# Patient Record
Sex: MALE | Race: WHITE | NOT HISPANIC OR LATINO
[De-identification: names, ages, dates, MRNs, and addresses within clinical notes are randomized per-mention and may not be internally consistent; named-entity substitution may affect disease eponyms.]

---

## 2020-11-09 PROBLEM — Z00.00 ENCOUNTER FOR PREVENTIVE HEALTH EXAMINATION: Status: ACTIVE | Noted: 2020-11-09

## 2020-11-13 ENCOUNTER — APPOINTMENT (OUTPATIENT)
Dept: COLORECTAL SURGERY | Facility: CLINIC | Age: 50
End: 2020-11-13
Payer: COMMERCIAL

## 2020-11-13 VITALS — HEIGHT: 72 IN | TEMPERATURE: 97.8 F

## 2020-11-13 DIAGNOSIS — Z87.19 PERSONAL HISTORY OF OTHER DISEASES OF THE DIGESTIVE SYSTEM: ICD-10-CM

## 2020-11-13 DIAGNOSIS — R19.4 CHANGE IN BOWEL HABIT: ICD-10-CM

## 2020-11-13 DIAGNOSIS — Z87.898 PERSONAL HISTORY OF OTHER SPECIFIED CONDITIONS: ICD-10-CM

## 2020-11-13 DIAGNOSIS — Z78.9 OTHER SPECIFIED HEALTH STATUS: ICD-10-CM

## 2020-11-13 PROCEDURE — 99243 OFF/OP CNSLTJ NEW/EST LOW 30: CPT | Mod: 25

## 2020-11-13 PROCEDURE — 99072 ADDL SUPL MATRL&STAF TM PHE: CPT

## 2020-11-13 PROCEDURE — 46221 LIGATION OF HEMORRHOID(S): CPT

## 2020-11-13 RX ORDER — BUPROPION HYDROCHLORIDE 150 MG/1
150 TABLET, EXTENDED RELEASE ORAL
Refills: 0 | Status: ACTIVE | COMMUNITY

## 2020-11-13 NOTE — ASSESSMENT
[FreeTextEntry1] : 50-year-old male with prolapsing hemorrhoids. Recommend rubber band ligation procedure,Recommend high fiber diet, Metamucil daily, sitz baths, stool softeners, pain medications p.r.n.

## 2020-11-13 NOTE — HISTORY OF PRESENT ILLNESS
[FreeTextEntry1] : Consultation requested by Dr. Leroy Luke for hemorrhoids. 40-year-old male  with a long-standing history of prolapsing hemorrhoids,With long-standing history of prolapsing internal and external hemorrhoids. Symptoms have been present for many years and worsening. Bleeding as bright red with bowel movements. Has occasional pain.

## 2020-11-13 NOTE — PHYSICAL EXAM
[Abdomen Masses] : No abdominal masses [Abdomen Tenderness] : ~T No ~M abdominal tenderness [Tender] : nontender [Normal rectal exam] : exam was normal [Manually Reducible] : a manually reducible (grade III) [Tender, Swollen] : tender, swollen [Normal] : was normal [None] : there was no rectal mass  [JVD] : no jugular venous distention  [Normal Breath Sounds] : Normal breath sounds [Normal Heart Sounds] : normal heart sounds [Normal Rate and Rhythm] : normal rate and rhythm [No Rash or Lesion] : No rash or lesion [Alert] : alert [Oriented to Person] : oriented to person [Oriented to Place] : oriented to place [Oriented to Time] : oriented to time [Calm] : calm [de-identified] : Looks well in no distress, of stated age. [de-identified] : pupils equal reactive to light normocephalic atraumatic. [de-identified] : moves all 4 extremities appropriately with 5 over 5 strength

## 2020-11-13 NOTE — REVIEW OF SYSTEMS
[As Noted in HPI] : as noted in HPI [Constipation] : constipation [Diarrhea] : diarrhea [Negative] : Heme/Lymph [FreeTextEntry7] : Prolapsing hemorrhoids

## 2020-11-13 NOTE — CONSULT LETTER
[Dear  ___] : Dear  [unfilled], [Consult Letter:] : I had the pleasure of evaluating your patient, [unfilled]. [Please see my note below.] : Please see my note below. [Consult Closing:] : Thank you very much for allowing me to participate in the care of this patient.  If you have any questions, please do not hesitate to contact me. [Sincerely,] : Sincerely, [FreeTextEntry3] : Erasto Watkins M.D. FACS, FASCRS

## 2020-12-02 ENCOUNTER — APPOINTMENT (OUTPATIENT)
Dept: COLORECTAL SURGERY | Facility: CLINIC | Age: 50
End: 2020-12-02
Payer: COMMERCIAL

## 2020-12-02 VITALS — HEIGHT: 72 IN | TEMPERATURE: 97.8 F | RESPIRATION RATE: 16 BRPM

## 2020-12-02 PROCEDURE — 46221 LIGATION OF HEMORRHOID(S): CPT

## 2020-12-02 PROCEDURE — 99214 OFFICE O/P EST MOD 30 MIN: CPT | Mod: 25

## 2020-12-02 PROCEDURE — 99072 ADDL SUPL MATRL&STAF TM PHE: CPT

## 2020-12-02 NOTE — HISTORY OF PRESENT ILLNESS
[FreeTextEntry1] :  40-year-old male  with a long-standing history of prolapsing hemorrhoids,With long-standing history of prolapsing internal and external hemorrhoids. Symptoms have been present for many years and worsening. Bleeding as bright red with bowel movements. Has occasional pain.Underwent previous rubber band ligation procedure with good results

## 2020-12-02 NOTE — PHYSICAL EXAM
[Abdomen Masses] : No abdominal masses [Abdomen Tenderness] : ~T No ~M abdominal tenderness [Tender] : nontender [Normal rectal exam] : exam was normal [Manually Reducible] : a manually reducible (grade III) [Tender, Swollen] : tender, swollen [Normal] : was normal [None] : there was no rectal mass  [JVD] : no jugular venous distention  [Normal Breath Sounds] : Normal breath sounds [Normal Heart Sounds] : normal heart sounds [Normal Rate and Rhythm] : normal rate and rhythm [No Rash or Lesion] : No rash or lesion [Alert] : alert [Oriented to Person] : oriented to person [Oriented to Place] : oriented to place [Oriented to Time] : oriented to time [Calm] : calm [de-identified] : Looks well in no distress, of stated age. [de-identified] : pupils equal reactive to light normocephalic atraumatic. [de-identified] : moves all 4 extremities appropriately with 5 over 5 strength

## 2020-12-02 NOTE — CONSULT LETTER
[Please see my note below.] : Please see my note below. [Consult Closing:] : Thank you very much for allowing me to participate in the care of this patient.  If you have any questions, please do not hesitate to contact me. [Sincerely,] : Sincerely, [FreeTextEntry3] : Erasto Watkins M.D. FACS, FASCRS

## 2020-12-10 ENCOUNTER — APPOINTMENT (OUTPATIENT)
Dept: PHYSICAL MEDICINE AND REHAB | Facility: CLINIC | Age: 50
End: 2020-12-10
Payer: OTHER MISCELLANEOUS

## 2020-12-10 VITALS
SYSTOLIC BLOOD PRESSURE: 142 MMHG | TEMPERATURE: 97.7 F | DIASTOLIC BLOOD PRESSURE: 87 MMHG | HEART RATE: 74 BPM | OXYGEN SATURATION: 98 %

## 2020-12-10 PROCEDURE — 99072 ADDL SUPL MATRL&STAF TM PHE: CPT

## 2020-12-10 PROCEDURE — 99204 OFFICE O/P NEW MOD 45 MIN: CPT | Mod: GC

## 2020-12-10 RX ORDER — OMEPRAZOLE 20 MG/1
20 CAPSULE, DELAYED RELEASE ORAL
Refills: 0 | Status: ACTIVE | COMMUNITY
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
## 2020-12-10 NOTE — PHYSICAL EXAM
[FreeTextEntry1] : General:  Awake and alert in no acute distress, \par Psych: normal mood and affect. \par HEENT: NC/AT, normal visual tracking\par Pulmonary: no resp distress, chest expansion appears symmetrical\par CV: extremities are warm and perfused\par Abd: non-distended\par Ext: no c/c/e\par \par Gait: grossly normal \par \par CERVICAL SPINE REGION:\par Inspection, cervical: normal, no listing of the head, no gross asymmetries\par \par Active ROM of the cervical spine: (Estimated range):  \par Flexion: full, no pain                                 \par Right- Side-bending: moderately limited ROM; Pain on right.\par Left-Side-bending: full ROM, no pain			\par Extension: full +pain on right\par Rotation - Right: 45deg +pain    \par Rotation - Left: 90 deg -pain.			\par Oblique extension: Right full, mild pain\par Oblique extension: Left full, no pain\par -ve Lhermitte's sign\par 		\par Palpation: very mild TTP at Cervical paraspinals on right and in suboccipital area on the right\par 		\par Reflexes: Upper limbs:		RIGHT	   LEFT	\par Biceps	C5-6		                2+	    2+\par Brachioradialis	C5-6		2+                2+\par Triceps	C(6)7-8(1)		2+	    2+\par 		\par Strength, upper limbs: 	\par 5/5 in SA, EF, EE, WE, ADM, APB bilaterally \par 		\par Sensation: Upper limbs:\par Intact to pinprick over C3-T1 bilateral UE dermatomes \par 		\par Tests for cervical radiculopathy/myelopathy: 	\par Spurling’s sign: negative bilaterally\par \par Long tract signs for myelopathy/UMN process: \par UMN Sign	                           \par Guidry sign: negative bilaterally			\par Ankle clonus:			negative\par Knee jerk: 			2+/4 bilaterally\par Ankle jerk: 			2+/4 bilaterally\par \par

## 2020-12-10 NOTE — ASSESSMENT
[FreeTextEntry1] : 50M with history of diverticulits, cervical pain likely due to ligamentous strain and inflammation sustained in a work injury. \par \par 1. Start standing celebrex for inflammation for 3 weeks.  Patient instructed to take omeprazole for GI ppx while on celebrex. Side effects and drug interactions reviewed.\par 2. Continue PT 3x/week for ROM, strengthening\par 3. Prescription sent for cervical traction brace and cervical traction pillow.  These were also discussed in detail with the patient and he was made aware of their availability OTC. \par 4. Given pt's history of diverticulosis/diverticulitis, will defer medrol dose pack for the time being.  However, if patient does not improve on standing celebrex can consider short course of steroids\par 5. Pt to continue f/u with pain management for possible ablation \par 6. f/u in 3 weeks

## 2020-12-10 NOTE — REVIEW OF SYSTEMS
[Patient Intake Form Reviewed] : Patient intake form was reviewed [Joint Pain] : joint pain [Muscle Pain] : muscle pain [Negative] : Psychiatric [FreeTextEntry8] : +urinary frequency

## 2021-01-05 ENCOUNTER — APPOINTMENT (OUTPATIENT)
Dept: COLORECTAL SURGERY | Facility: CLINIC | Age: 51
End: 2021-01-05
Payer: COMMERCIAL

## 2021-01-05 PROCEDURE — 46221 LIGATION OF HEMORRHOID(S): CPT

## 2021-01-05 PROCEDURE — 99214 OFFICE O/P EST MOD 30 MIN: CPT | Mod: 25

## 2021-01-05 PROCEDURE — 99072 ADDL SUPL MATRL&STAF TM PHE: CPT

## 2021-01-06 NOTE — HISTORY OF PRESENT ILLNESS
[FreeTextEntry1] : 50-year-old male  with a long-standing history of prolapsing hemorrhoids,With long-standing history of prolapsing internal and external hemorrhoids. Symptoms have been present for many years and worsening. Bleeding as bright red with bowel movements. Has occasional pain.Underwent previous rubber band ligation procedure with good results

## 2021-01-06 NOTE — PHYSICAL EXAM
[Abdomen Masses] : No abdominal masses [Abdomen Tenderness] : ~T No ~M abdominal tenderness [Tender] : nontender [Normal rectal exam] : exam was normal [Manually Reducible] : a manually reducible (grade III) [Tender, Swollen] : tender, swollen [Normal] : was normal [None] : there was no rectal mass  [JVD] : no jugular venous distention  [Normal Breath Sounds] : Normal breath sounds [Normal Heart Sounds] : normal heart sounds [Normal Rate and Rhythm] : normal rate and rhythm [No Rash or Lesion] : No rash or lesion [Alert] : alert [Oriented to Person] : oriented to person [Oriented to Place] : oriented to place [Oriented to Time] : oriented to time [Calm] : calm [de-identified] : Looks well in no distress, of stated age. [de-identified] : pupils equal reactive to light normocephalic atraumatic. [de-identified] : moves all 4 extremities appropriately with 5 over 5 strength

## 2021-01-11 ENCOUNTER — APPOINTMENT (OUTPATIENT)
Dept: PHYSICAL MEDICINE AND REHAB | Facility: CLINIC | Age: 51
End: 2021-01-11
Payer: OTHER MISCELLANEOUS

## 2021-01-11 VITALS
SYSTOLIC BLOOD PRESSURE: 145 MMHG | OXYGEN SATURATION: 98 % | HEIGHT: 74 IN | WEIGHT: 180 LBS | HEART RATE: 81 BPM | DIASTOLIC BLOOD PRESSURE: 94 MMHG | BODY MASS INDEX: 23.1 KG/M2 | TEMPERATURE: 97.8 F

## 2021-01-11 PROCEDURE — 99214 OFFICE O/P EST MOD 30 MIN: CPT

## 2021-01-11 PROCEDURE — 99072 ADDL SUPL MATRL&STAF TM PHE: CPT

## 2021-01-11 RX ORDER — BUPROPION HYDROCHLORIDE 150 MG/1
150 TABLET, EXTENDED RELEASE ORAL
Qty: 30 | Refills: 0 | Status: ACTIVE | COMMUNITY
Start: 2020-10-09

## 2021-01-11 RX ORDER — DEXTROAMPHETAMINE SACCHARATE, AMPHETAMINE ASPARTATE, DEXTROAMPHETAMINE SULFATE AND AMPHETAMINE SULFATE 2.5; 2.5; 2.5; 2.5 MG/1; MG/1; MG/1; MG/1
10 TABLET ORAL
Qty: 30 | Refills: 0 | Status: ACTIVE | COMMUNITY
Start: 2020-12-22

## 2021-01-11 RX ORDER — OXYBUTYNIN CHLORIDE 5 MG/1
5 TABLET ORAL
Refills: 0 | Status: DISCONTINUED | COMMUNITY
End: 2021-01-11
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
## 2021-01-11 NOTE — ASSESSMENT
[FreeTextEntry1] : Patient is a 50-year-old male history of diverticulitis and neck pain likely 2/2 cervical spondylosis/strain, no radicular dysfunction on clinical exam. Patient has been responding well to course of Celebrex. Prescription provided for the Medrol Dosepak with omeprazole and patient to restart Celebrex on the final day of the Medrol Dosepak. Side effects and drug interactions reviewed. Recommend patient used the cervical traction brace one-hour per evening for the next 2 weeks to assess efficacy. Patient to continue outpatient physical therapy and is scheduled for electrodiagnostic testing next week. Patient to have a results of electrodiagnostic testing and previous MRI fax to the office.\par \par I spent a total of 25 minutes on the date of encounter evaluating and treating the patient including discussion of treatment options.

## 2021-01-11 NOTE — REVIEW OF SYSTEMS
[Negative] : Gastrointestinal [Fever] : no fever [Incontinence] : no incontinence [Muscle Weakness] : no muscle weakness [Skin Wound] : no skin wound [Difficulty Walking] : no difficulty walking

## 2021-01-11 NOTE — PHYSICAL EXAM
[FreeTextEntry1] : General:  Awake and alert in no acute distress, \par Psych: normal mood and affect. \par HEENT: NC/AT\par Ext: no c/c/e\par \par Gait: grossly normal \par \par CERVICAL SPINE REGION:\par Inspection, cervical: normal, no listing of the head, no gross asymmetries\par \par Active ROM of the cervical spine: (Estimated range):  \par Flexion: full, no pain                                 			\par Extension: full +pain on right, (-)Spurling test			\par Oblique extension: Right full, mild pain\par Oblique extension: Left full, no pain\par -ve Lhermitte's sign\par 		\par Palpation: (+) TTP at suboccipital area on the right, no paraspinal spasm\par 		\par Reflexes: Upper limbs:		RIGHT	   LEFT	\par Biceps	C5-6		                2+	    2+\par Brachioradialis	C5-6		2+                2+\par Triceps	C(6)7-8(1)		2+	    2+\par \par Knee jerk: 			2+/4 bilaterally\par Ankle jerk: 			2+/4 bilaterally\par 		\par Strength, upper limbs: 	\par 5/5 in SA, EF, EE, WE, bilaterally \par 		\par Sensation: Upper limbs:\par Intact to pinprick over C3-T1 bilateral UE dermatomes \par 		\par \par \par

## 2021-02-02 ENCOUNTER — APPOINTMENT (OUTPATIENT)
Dept: COLORECTAL SURGERY | Facility: CLINIC | Age: 51
End: 2021-02-02

## 2021-02-22 ENCOUNTER — APPOINTMENT (OUTPATIENT)
Dept: PHYSICAL MEDICINE AND REHAB | Facility: CLINIC | Age: 51
End: 2021-02-22
Payer: OTHER MISCELLANEOUS

## 2021-02-22 VITALS
DIASTOLIC BLOOD PRESSURE: 90 MMHG | TEMPERATURE: 97.7 F | OXYGEN SATURATION: 99 % | SYSTOLIC BLOOD PRESSURE: 139 MMHG | HEART RATE: 64 BPM

## 2021-02-22 PROCEDURE — 99072 ADDL SUPL MATRL&STAF TM PHE: CPT

## 2021-02-22 PROCEDURE — 99213 OFFICE O/P EST LOW 20 MIN: CPT

## 2021-02-22 RX ORDER — BUDESONIDE AND FORMOTEROL FUMARATE DIHYDRATE 160; 4.5 UG/1; UG/1
160-4.5 AEROSOL RESPIRATORY (INHALATION)
Qty: 10 | Refills: 0 | Status: ACTIVE | COMMUNITY
Start: 2021-02-12

## 2021-02-22 RX ORDER — BUDESONIDE 180 UG/1
180 AEROSOL, POWDER RESPIRATORY (INHALATION)
Qty: 1 | Refills: 0 | Status: ACTIVE | COMMUNITY
Start: 2021-01-06

## 2021-02-22 RX ORDER — FAMOTIDINE 40 MG/1
40 TABLET, FILM COATED ORAL
Qty: 30 | Refills: 0 | Status: ACTIVE | COMMUNITY
Start: 2021-02-10

## 2021-02-22 RX ORDER — METHYLPREDNISOLONE 4 MG/1
4 TABLET ORAL
Qty: 1 | Refills: 0 | Status: DISCONTINUED | COMMUNITY
Start: 2021-01-11 | End: 2021-02-22

## 2021-02-22 NOTE — PHYSICAL EXAM
[FreeTextEntry1] : General:  Awake and alert in no acute distress, \par Psych: normal mood and affect. \par HEENT: NC/AT\par Ext: no c/c/e\par \par Gait: grossly normal, intact heel and toe walk\par \par CERVICAL SPINE REGION:\par Inspection, cervical: normal, no listing of the head, no gross asymmetries. No paraspinal muscle spasm. (+)tender points on right cervical paraspinal muscles\par \par Active ROM of the cervical spine: (Estimated range):  \par Flexion: full, no pain                                 			\par Extension: full +pain on right, (-)Spurling test			\par Oblique extension: Right full, mild pain\par Oblique extension: Left full, no pain\par \par 		\par Palpation: (+) TTP at suboccipital area on the right, no paraspinal spasm\par 		\par Reflexes: Upper limbs:		RIGHT	   LEFT	\par Biceps	C5-6		                1+	    1+\par Brachioradialis	C5-6		1+                1+\par Triceps	C(6)7-8(1)		1/2+	    1/2+\par \par Knee jerk: 			1/2 bilaterally\par Ankle jerk: 			2/4 bilaterally\par Negative inverted radial reflex (B)\par 		\par Strength, upper limbs: 	\par 5/5 in SA, EF, EE, WE, bilaterally \par 		\par Sensation: Upper limbs:\par Intact to pinprick/LT over C3-T1 bilateral UE dermatomes \par 		\par \par \par
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
## 2021-02-22 NOTE — ASSESSMENT
[FreeTextEntry1] : Patient is a 50-year-old male history of diverticulitis and neck pain likely 2/2 cervical spondylosis/strain, no radicular dysfunction on clinical exam. Patient has been responding well to course of Celebrex. Prescription provided for the Medrol Dosepak with omeprazole and patient to restart Celebrex on the final day of the Medrol Dosepak. Side effects and drug interactions reviewed. Reviewed with patient cervical stretching program including isometric reciprocal inhibition which should be performed at least b.i.d. Patient to continue outpatient physical therapy and results of electrodiagnostic testing to be faxed to office. Reviewed results of MRI cervical spine (8/2020) with patient. Patient with pain management f/u tomorrow.\par \par I spent a total of 25 minutes on the date of encounter evaluating and treating the patient including discussion of treatment options.

## 2021-03-02 ENCOUNTER — APPOINTMENT (OUTPATIENT)
Dept: COLORECTAL SURGERY | Facility: CLINIC | Age: 51
End: 2021-03-02
Payer: COMMERCIAL

## 2021-03-02 VITALS — TEMPERATURE: 97.3 F | WEIGHT: 180 LBS | RESPIRATION RATE: 16 BRPM | HEIGHT: 74 IN | BODY MASS INDEX: 23.1 KG/M2

## 2021-03-02 DIAGNOSIS — K64.4 RESIDUAL HEMORRHOIDAL SKIN TAGS: ICD-10-CM

## 2021-03-02 DIAGNOSIS — K64.8 RESIDUAL HEMORRHOIDAL SKIN TAGS: ICD-10-CM

## 2021-03-02 PROCEDURE — 99072 ADDL SUPL MATRL&STAF TM PHE: CPT

## 2021-03-02 PROCEDURE — 99214 OFFICE O/P EST MOD 30 MIN: CPT | Mod: 25

## 2021-03-02 PROCEDURE — 46221 LIGATION OF HEMORRHOID(S): CPT

## 2021-03-02 NOTE — PHYSICAL EXAM
[Abdomen Masses] : No abdominal masses [Abdomen Tenderness] : ~T No ~M abdominal tenderness [Tender] : nontender [Normal rectal exam] : exam was normal [Manually Reducible] : a manually reducible (grade III) [Tender, Swollen] : tender, swollen [Normal] : was normal [None] : there was no rectal mass  [JVD] : no jugular venous distention  [Normal Breath Sounds] : Normal breath sounds [Normal Heart Sounds] : normal heart sounds [Normal Rate and Rhythm] : normal rate and rhythm [No Rash or Lesion] : No rash or lesion [Alert] : alert [Oriented to Person] : oriented to person [Oriented to Place] : oriented to place [Oriented to Time] : oriented to time [Calm] : calm [de-identified] : Looks well in no distress, of stated age. [de-identified] : pupils equal reactive to light normocephalic atraumatic. [de-identified] : moves all 4 extremities appropriately with 5 over 5 strength

## 2021-04-05 ENCOUNTER — APPOINTMENT (OUTPATIENT)
Dept: PHYSICAL MEDICINE AND REHAB | Facility: CLINIC | Age: 51
End: 2021-04-05

## 2021-09-21 ENCOUNTER — APPOINTMENT (OUTPATIENT)
Dept: PHYSICAL MEDICINE AND REHAB | Facility: CLINIC | Age: 51
End: 2021-09-21
Payer: OTHER MISCELLANEOUS

## 2021-09-21 VITALS
TEMPERATURE: 96.7 F | SYSTOLIC BLOOD PRESSURE: 138 MMHG | OXYGEN SATURATION: 98 % | DIASTOLIC BLOOD PRESSURE: 90 MMHG | HEART RATE: 81 BPM

## 2021-09-21 DIAGNOSIS — M50.20 OTHER CERVICAL DISC DISPLACEMENT, UNSPECIFIED CERVICAL REGION: ICD-10-CM

## 2021-09-21 DIAGNOSIS — M54.2 CERVICALGIA: ICD-10-CM

## 2021-09-21 PROCEDURE — 99213 OFFICE O/P EST LOW 20 MIN: CPT

## 2021-09-21 PROCEDURE — 99072 ADDL SUPL MATRL&STAF TM PHE: CPT

## 2021-09-21 RX ORDER — METHYLPREDNISOLONE 4 MG/1
4 TABLET ORAL
Qty: 1 | Refills: 0 | Status: COMPLETED | OUTPATIENT
Start: 2021-02-22 | End: 2021-09-21

## 2021-09-21 RX ORDER — CELECOXIB 200 MG/1
200 CAPSULE ORAL
Qty: 60 | Refills: 0 | Status: COMPLETED | COMMUNITY
Start: 2020-12-10 | End: 2021-09-21

## 2021-09-21 NOTE — ASSESSMENT
[FreeTextEntry1] : Patient is a 51-year-old male history of diverticulitis, chronic neck pain within no radicular dysfunction on clinical exam. Reviewed MRI cervical spine from September, 2021 with patient. Agree with continued conservative management and discussed with patient recommendation for being involved in a comprehensive pain management service. Will initiate Cymbalta 20 mg p.o. q. daily to help address his chronic pain, side effects and drug interaction reviewed. Discuss with patient at this is a low dose and should be titrated upwards with his pain management service. 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 \par \par I spent a total of 20 minutes on the date of the encounter evaluating and treatment patient including a review of MRI results and discussion of treatment options.

## 2021-09-21 NOTE — PHYSICAL EXAM
[FreeTextEntry1] : General:  Awake and alert in no acute distress, cooperative \par HEENT: NC/AT, MMM\par Ext: no c/c/e\par \par Gait: grossly normal. Heel and Toe walk intact.\par \par CERVICAL SPINE REGION:\par Inspection, cervical: normal, no listing of the head, no gross asymmetries. No paraspinal muscle spasm\par \par Active ROM of the cervical spine: (Estimated range):  \par Flexion: limited by (-)3 fb with pain reported                                \par Right- Side-bending: moderately limited ROM; Pain on right, ~20deg\par Left-Side-bending: ~20deg, no pain			\par Extension: +pain on right, to ~20deg\par Rotation - Right: 45deg +pain    \par Rotation - Left: 45 deg -pain.			\par (-)Spurling sign\par 		\par Palpation:  mild Tender point at proximal right trapezius muscle. \par 		\par MSR: biceps, BR, TR +2, symmetric. Negative inverted radial reflex (B). +1 KJ, +1 AJ and symmetric.\par 	\par Strength, upper limbs: 	\par 5/5 in SA, EF, EE, WE, ADM, APB bilaterally \par 		\par Sensation: Upper limbs:\par Intact to pinprick over C5-T1 bilateral UE dermatomes \par 		\par \par \par \par
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
## 2021-09-21 NOTE — REVIEW OF SYSTEMS
[Negative] : Respiratory [Fever] : no fever [Incontinence] : no incontinence [Muscle Weakness] : no muscle weakness [Difficulty Walking] : no difficulty walking

## 2021-10-05 ENCOUNTER — TRANSCRIPTION ENCOUNTER (OUTPATIENT)
Age: 51
End: 2021-10-05

## 2022-01-27 ENCOUNTER — RX RENEWAL (OUTPATIENT)
Age: 52
End: 2022-01-27

## 2022-02-24 ENCOUNTER — APPOINTMENT (OUTPATIENT)
Dept: PHYSICAL MEDICINE AND REHAB | Facility: CLINIC | Age: 52
End: 2022-02-24
Payer: OTHER MISCELLANEOUS

## 2022-02-24 DIAGNOSIS — M47.812 SPONDYLOSIS W/OUT MYELOPATHY OR RADICULOPATHY, CERVICAL REGION: ICD-10-CM

## 2022-02-24 DIAGNOSIS — M54.2 CERVICALGIA: ICD-10-CM

## 2022-02-24 DIAGNOSIS — G89.29 CERVICALGIA: ICD-10-CM

## 2022-02-24 PROCEDURE — 99212 OFFICE O/P EST SF 10 MIN: CPT | Mod: 95

## 2022-02-24 RX ORDER — DULOXETINE HYDROCHLORIDE 20 MG/1
20 CAPSULE, DELAYED RELEASE PELLETS ORAL
Qty: 30 | Refills: 3 | Status: DISCONTINUED | COMMUNITY
Start: 2021-09-21 | End: 2022-02-24
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
## 2022-02-24 NOTE — REVIEW OF SYSTEMS
[Negative] : Gastrointestinal [Fever] : no fever [Incontinence] : no incontinence [Muscle Weakness] : no muscle weakness [Difficulty Walking] : no difficulty walking
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
## 2022-02-24 NOTE — REASON FOR VISIT
[Home] : at home, [unfilled] , at the time of the visit. [Medical Office: ([facility])___] : at the medical office located in  [Verbal consent obtained from patient] : the patient, [unfilled] [Follow-Up] : a follow-up visit

## 2022-03-20 ENCOUNTER — FORM ENCOUNTER (OUTPATIENT)
Age: 52
End: 2022-03-20

## 2022-03-22 ENCOUNTER — NON-APPOINTMENT (OUTPATIENT)
Age: 52
End: 2022-03-22

## 2022-03-24 ENCOUNTER — OUTPATIENT (OUTPATIENT)
Dept: OUTPATIENT SERVICES | Facility: HOSPITAL | Age: 52
LOS: 1 days | End: 2022-03-24
Payer: COMMERCIAL

## 2022-03-24 ENCOUNTER — APPOINTMENT (OUTPATIENT)
Age: 52
End: 2022-03-24
Payer: COMMERCIAL

## 2022-03-24 DIAGNOSIS — G47.33 OBSTRUCTIVE SLEEP APNEA (ADULT) (PEDIATRIC): ICD-10-CM

## 2022-03-24 PROCEDURE — 95811 POLYSOM 6/>YRS CPAP 4/> PARM: CPT

## 2022-03-24 PROCEDURE — 95811 POLYSOM 6/>YRS CPAP 4/> PARM: CPT | Mod: 26

## 2022-06-20 ENCOUNTER — RX RENEWAL (OUTPATIENT)
Age: 52
End: 2022-06-20

## 2022-08-18 ENCOUNTER — RX RENEWAL (OUTPATIENT)
Age: 52
End: 2022-08-18

## 2022-08-18 RX ORDER — DULOXETINE HYDROCHLORIDE 30 MG/1
30 CAPSULE, DELAYED RELEASE PELLETS ORAL
Qty: 30 | Refills: 4 | Status: ACTIVE | COMMUNITY
Start: 2022-02-24 | End: 1900-01-01

## 2022-11-06 ENCOUNTER — TRANSCRIPTION ENCOUNTER (OUTPATIENT)
Age: 52
End: 2022-11-06

## 2022-11-07 ENCOUNTER — OUTPATIENT (OUTPATIENT)
Dept: OUTPATIENT SERVICES | Facility: HOSPITAL | Age: 52
LOS: 1 days | End: 2022-11-07
Payer: COMMERCIAL

## 2022-11-07 DIAGNOSIS — M54.12 RADICULOPATHY, CERVICAL REGION: ICD-10-CM

## 2022-11-07 PROCEDURE — 76000 FLUOROSCOPY <1 HR PHYS/QHP: CPT

## 2022-11-07 PROCEDURE — 62321 NJX INTERLAMINAR CRV/THRC: CPT

## 2023-07-09 ENCOUNTER — TRANSCRIPTION ENCOUNTER (OUTPATIENT)
Age: 53
End: 2023-07-09

## 2023-07-10 ENCOUNTER — OUTPATIENT (OUTPATIENT)
Dept: OUTPATIENT SERVICES | Facility: HOSPITAL | Age: 53
LOS: 1 days | End: 2023-07-10
Payer: COMMERCIAL

## 2023-07-10 DIAGNOSIS — M47.812 SPONDYLOSIS WITHOUT MYELOPATHY OR RADICULOPATHY, CERVICAL REGION: ICD-10-CM

## 2023-07-10 PROCEDURE — 64490 INJ PARAVERT F JNT C/T 1 LEV: CPT | Mod: 50

## 2023-07-10 PROCEDURE — 76000 FLUOROSCOPY <1 HR PHYS/QHP: CPT

## 2023-07-10 PROCEDURE — 64491 INJ PARAVERT F JNT C/T 2 LEV: CPT | Mod: LT

## 2023-07-25 ENCOUNTER — APPOINTMENT (OUTPATIENT)
Dept: GASTROENTEROLOGY | Facility: CLINIC | Age: 53
End: 2023-07-25
Payer: COMMERCIAL

## 2023-07-25 VITALS
HEART RATE: 87 BPM | HEIGHT: 72 IN | OXYGEN SATURATION: 98 % | BODY MASS INDEX: 27.09 KG/M2 | WEIGHT: 200 LBS | DIASTOLIC BLOOD PRESSURE: 78 MMHG | TEMPERATURE: 97 F | SYSTOLIC BLOOD PRESSURE: 127 MMHG

## 2023-07-25 DIAGNOSIS — K21.9 GASTRO-ESOPHAGEAL REFLUX DISEASE W/OUT ESOPHAGITIS: ICD-10-CM

## 2023-07-25 DIAGNOSIS — K22.4 DYSKINESIA OF ESOPHAGUS: ICD-10-CM

## 2023-07-25 PROCEDURE — 99204 OFFICE O/P NEW MOD 45 MIN: CPT

## 2023-08-07 NOTE — PHYSICAL EXAM
[Alert] : alert [Normal Voice/Communication] : normal voice/communication [Healthy Appearing] : healthy appearing [No Acute Distress] : no acute distress [Sclera] : the sclera and conjunctiva were normal [Hearing Threshold Finger Rub Not [place]] : hearing was normal [Normal Lips/Gums] : the lips and gums were normal [Oropharynx] : the oropharynx was normal [Normal Appearance] : the appearance of the neck was normal [No Neck Mass] : no neck mass was observed [No Respiratory Distress] : no respiratory distress [No Acc Muscle Use] : no accessory muscle use [Respiration, Rhythm And Depth] : normal respiratory rhythm and effort [Auscultation Breath Sounds / Voice Sounds] : lungs were clear to auscultation bilaterally [Heart Rate And Rhythm] : heart rate was normal and rhythm regular [Normal S1, S2] : normal S1 and S2 [Murmurs] : no murmurs [Bowel Sounds] : normal bowel sounds [Abdomen Tenderness] : non-tender [No Masses] : no abdominal mass palpated [Abdomen Soft] : soft [] : no hepatosplenomegaly [Abnormal Walk] : normal gait [Involuntary Movements] : no involuntary movements were seen [Oriented To Time, Place, And Person] : oriented to person, place, and time [Normal Affect] : the affect was normal [Normal Mood] : the mood was normal

## 2023-08-07 NOTE — ASSESSMENT
[FreeTextEntry1] : 53 M pmh GERD with now progressing dysphagia and chest pain.  Will start with endoscopic evaluation, but may need motility testing as well.  Impression: 1) Dysphagia 2) Atypical chest pain 3) GERD  Plan: 1) EGD/EndoFLIP 2) Pending above: - May need BaS - May need E-mano 3) All discussed at length. Many questions answered.  Plan agreed upon 4) RPV pending above
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
## 2023-09-20 ENCOUNTER — TRANSCRIPTION ENCOUNTER (OUTPATIENT)
Age: 53
End: 2023-09-20

## 2023-09-20 ENCOUNTER — OUTPATIENT (OUTPATIENT)
Dept: OUTPATIENT SERVICES | Facility: HOSPITAL | Age: 53
LOS: 1 days | End: 2023-09-20
Payer: COMMERCIAL

## 2023-09-20 ENCOUNTER — RESULT REVIEW (OUTPATIENT)
Age: 53
End: 2023-09-20

## 2023-09-20 ENCOUNTER — APPOINTMENT (OUTPATIENT)
Dept: GASTROENTEROLOGY | Facility: HOSPITAL | Age: 53
End: 2023-09-20

## 2023-09-20 VITALS
HEART RATE: 78 BPM | SYSTOLIC BLOOD PRESSURE: 136 MMHG | TEMPERATURE: 98 F | WEIGHT: 195.11 LBS | DIASTOLIC BLOOD PRESSURE: 81 MMHG | HEIGHT: 72 IN | RESPIRATION RATE: 18 BRPM | OXYGEN SATURATION: 96 %

## 2023-09-20 VITALS
HEART RATE: 61 BPM | OXYGEN SATURATION: 97 % | SYSTOLIC BLOOD PRESSURE: 122 MMHG | DIASTOLIC BLOOD PRESSURE: 80 MMHG | RESPIRATION RATE: 16 BRPM

## 2023-09-20 DIAGNOSIS — K21.9 GASTRO-ESOPHAGEAL REFLUX DISEASE WITHOUT ESOPHAGITIS: ICD-10-CM

## 2023-09-20 DIAGNOSIS — K22.4 DYSKINESIA OF ESOPHAGUS: ICD-10-CM

## 2023-09-20 PROCEDURE — C1889: CPT

## 2023-09-20 PROCEDURE — 91040 ESOPH BALLOON DISTENSION TST: CPT | Mod: 26

## 2023-09-20 PROCEDURE — 91040 ESOPH BALLOON DISTENSION TST: CPT

## 2023-09-20 PROCEDURE — 43239 EGD BIOPSY SINGLE/MULTIPLE: CPT

## 2023-09-20 PROCEDURE — 88305 TISSUE EXAM BY PATHOLOGIST: CPT | Mod: 26

## 2023-09-20 PROCEDURE — 88305 TISSUE EXAM BY PATHOLOGIST: CPT

## 2023-09-20 PROCEDURE — 94640 AIRWAY INHALATION TREATMENT: CPT

## 2023-09-20 DEVICE — CATH ENDOFLIP MEASURE 16MM: Type: IMPLANTABLE DEVICE | Status: FUNCTIONAL

## 2023-09-20 RX ORDER — MELOXICAM 15 MG/1
0 TABLET ORAL
Refills: 0 | DISCHARGE

## 2023-09-20 RX ORDER — SODIUM CHLORIDE 9 MG/ML
500 INJECTION INTRAMUSCULAR; INTRAVENOUS; SUBCUTANEOUS
Refills: 0 | Status: COMPLETED | OUTPATIENT
Start: 2023-09-20 | End: 2023-09-20

## 2023-09-20 RX ORDER — FAMOTIDINE 10 MG/ML
0 INJECTION INTRAVENOUS
Refills: 0 | DISCHARGE

## 2023-09-20 RX ORDER — LIDOCAINE HCL 20 MG/ML
4 VIAL (ML) INJECTION ONCE
Refills: 0 | Status: COMPLETED | OUTPATIENT
Start: 2023-09-20 | End: 2023-09-20

## 2023-09-20 RX ADMIN — Medication 4 MILLILITER(S): at 12:08

## 2023-09-20 RX ADMIN — SODIUM CHLORIDE 30 MILLILITER(S): 9 INJECTION INTRAMUSCULAR; INTRAVENOUS; SUBCUTANEOUS at 13:11

## 2023-09-20 NOTE — ASU PATIENT PROFILE, ADULT - NSICDXPASTMEDICALHX_GEN_ALL_CORE_FT
PAST MEDICAL HISTORY:  Anxiety and depression     Cervical herniated disc     Chronic asthmatic bronchitis     Chronic GERD

## 2023-09-20 NOTE — ASU PATIENT PROFILE, ADULT - FALL HARM RISK - UNIVERSAL INTERVENTIONS
Bed in lowest position, wheels locked, appropriate side rails in place/Call bell, personal items and telephone in reach/Instruct patient to call for assistance before getting out of bed or chair/Non-slip footwear when patient is out of bed/Carlos to call system/Physically safe environment - no spills, clutter or unnecessary equipment/Purposeful Proactive Rounding/Room/bathroom lighting operational, light cord in reach

## 2023-09-20 NOTE — PRE PROCEDURE NOTE - PRE PROCEDURE EVALUATION
Attending Physician:              Jose Cabezas MD [facility]    Indication for Procedure          atypical chest pain      PAST MEDICAL & SURGICAL HISTORY:  Chronic asthmatic bronchitis      Chronic GERD      Anxiety and depression      Cervical herniated disc            See Allscripts Note for further details  ALLERGIES:  No Known Allergies    HOME MEDICATIONS:  albuterol 2puffs prn:   duloxetine hcl 20mg:   famotidine 40mg:   loratadine 10mg:   meloxicam 15mg:   methocarbamol 500mg:   omeprazole 80mg:   symbicort 2 puffs prn:   wellbutrin xl 150mg:       See Allscripts Note for further details    AICD/PPM: [ ] yes   [ x] no    PERTINENT LAB DATA:                      PHYSICAL EXAMINATION:    Height (cm): 182.9  Weight (kg): 88.5  BMI (kg/m2): 26.5  BSA (m2): 2.11T(C): 36.4  HR: 78  BP: 136/81  RR: 18  SpO2: 96%    Constitutional: NAD  HEENT: PERRLA, EOMI,    Neck:  No JVD  Respiratory: CTAB/L  Cardiovascular: S1 and S2  Gastrointestinal: BS+, soft, NT/ND  Extremities: No peripheral edema  Neurological: A/O x 3, no focal deficits  Psychiatric: Normal mood, normal affect  Skin: No rashes    ASA Class: I [ ]  II [ x]  III [ ]  IV [ ]    COMMENTS:    The patient is a suitable candidate for the planned procedure unless box checked [ ]  No, explain:

## 2023-09-25 LAB — SURGICAL PATHOLOGY STUDY: SIGNIFICANT CHANGE UP

## 2023-09-27 ENCOUNTER — TRANSCRIPTION ENCOUNTER (OUTPATIENT)
Age: 53
End: 2023-09-27

## 2023-10-02 ENCOUNTER — TRANSCRIPTION ENCOUNTER (OUTPATIENT)
Age: 53
End: 2023-10-02

## 2023-10-03 ENCOUNTER — NON-APPOINTMENT (OUTPATIENT)
Age: 53
End: 2023-10-03

## 2024-06-12 PROBLEM — J44.9 CHRONIC OBSTRUCTIVE PULMONARY DISEASE, UNSPECIFIED: Chronic | Status: ACTIVE | Noted: 2023-09-20

## 2024-06-12 PROBLEM — K21.9 GASTRO-ESOPHAGEAL REFLUX DISEASE WITHOUT ESOPHAGITIS: Chronic | Status: ACTIVE | Noted: 2023-09-20

## 2024-06-12 PROBLEM — M50.20 OTHER CERVICAL DISC DISPLACEMENT, UNSPECIFIED CERVICAL REGION: Chronic | Status: ACTIVE | Noted: 2023-09-20

## 2024-06-12 PROBLEM — F41.9 ANXIETY DISORDER, UNSPECIFIED: Chronic | Status: ACTIVE | Noted: 2023-09-20

## 2024-06-16 ENCOUNTER — TRANSCRIPTION ENCOUNTER (OUTPATIENT)
Age: 54
End: 2024-06-16

## 2024-06-17 ENCOUNTER — OUTPATIENT (OUTPATIENT)
Dept: OUTPATIENT SERVICES | Facility: HOSPITAL | Age: 54
LOS: 1 days | End: 2024-06-17
Payer: COMMERCIAL

## 2024-06-17 DIAGNOSIS — M47.812 SPONDYLOSIS WITHOUT MYELOPATHY OR RADICULOPATHY, CERVICAL REGION: ICD-10-CM

## 2024-06-17 PROCEDURE — 64490 INJ PARAVERT F JNT C/T 1 LEV: CPT | Mod: 50

## 2024-06-17 PROCEDURE — 64491 INJ PARAVERT F JNT C/T 2 LEV: CPT | Mod: LT

## 2024-06-17 PROCEDURE — 76000 FLUOROSCOPY <1 HR PHYS/QHP: CPT

## 2025-04-07 ENCOUNTER — APPOINTMENT (OUTPATIENT)
Dept: PAIN MANAGEMENT | Facility: CLINIC | Age: 55
End: 2025-04-07
Payer: COMMERCIAL

## 2025-04-07 VITALS — BODY MASS INDEX: 26.01 KG/M2 | WEIGHT: 192 LBS | HEIGHT: 72 IN

## 2025-04-07 DIAGNOSIS — M47.816 SPONDYLOSIS W/OUT MYELOPATHY OR RADICULOPATHY, LUMBAR REGION: ICD-10-CM

## 2025-04-07 DIAGNOSIS — M54.16 RADICULOPATHY, LUMBAR REGION: ICD-10-CM

## 2025-04-07 PROCEDURE — 99204 OFFICE O/P NEW MOD 45 MIN: CPT

## 2025-04-07 RX ORDER — METHYLPREDNISOLONE 4 MG/1
4 TABLET ORAL
Qty: 1 | Refills: 0 | Status: ACTIVE | COMMUNITY
Start: 2025-04-07 | End: 1900-01-01

## (undated) DEVICE — TUBING IV SET GRAVITY 3Y 100" MACRO

## (undated) DEVICE — SENSOR O2 FINGER ADULT

## (undated) DEVICE — COLONOSCOPE 2416901: Type: DURABLE MEDICAL EQUIPMENT

## (undated) DEVICE — CATH IV SAFE BC 20G X 1.16" (PINK)

## (undated) DEVICE — BIOPSY FORCEP RADIAL JAW 4 STANDARD WITH NEEDLE

## (undated) DEVICE — BALLOON US ENDO

## (undated) DEVICE — TUBING SUCTION 20FT

## (undated) DEVICE — FOLEY HOLDER STATLOCK 2 WAY ADULT

## (undated) DEVICE — OMNIPAQUE 180MG/ML 10ML 10/BX

## (undated) DEVICE — SOL INJ NS 0.9% 500ML 2 PORT

## (undated) DEVICE — BITE BLOCK ADULT 20 X 27MM (GREEN)

## (undated) DEVICE — TUBING SUCTION CONN 6FT STERILE

## (undated) DEVICE — PACK IV START WITH CHG

## (undated) DEVICE — SYR ALLIANCE II INFLATION 60ML

## (undated) DEVICE — SUCTION YANKAUER NO CONTROL VENT

## (undated) DEVICE — CATH IV SAFE BC 22G X 1" (BLUE)